# Patient Record
Sex: FEMALE | Race: WHITE | Employment: UNEMPLOYED | URBAN - METROPOLITAN AREA
[De-identification: names, ages, dates, MRNs, and addresses within clinical notes are randomized per-mention and may not be internally consistent; named-entity substitution may affect disease eponyms.]

---

## 2020-02-06 ENCOUNTER — OFFICE VISIT (OUTPATIENT)
Dept: ORTHOPEDIC SURGERY | Age: 55
End: 2020-02-06
Payer: COMMERCIAL

## 2020-02-06 VITALS — WEIGHT: 202 LBS | RESPIRATION RATE: 16 BRPM | HEIGHT: 65 IN | BODY MASS INDEX: 33.66 KG/M2

## 2020-02-06 PROCEDURE — 99243 OFF/OP CNSLTJ NEW/EST LOW 30: CPT | Performed by: INTERNAL MEDICINE

## 2020-02-06 PROCEDURE — L3908 WHO COCK-UP NONMOLDE PRE OTS: HCPCS | Performed by: INTERNAL MEDICINE

## 2020-02-06 NOTE — PROGRESS NOTES
is      Palpation: Focal tenderness over the lateral condyle region reproducing her pain      Range of Motion: Full range with flexion and extension with low-grade pain      Strength: Normal without reproduction of pain extensor supinator      Special Tests: Modified liftoff minimally positive      Skin: There are no rashes, ulcerations or lesions. Gait: Nonantalgic      Reflex intact upper     Additional Comments:        Additional Examinations:           Left Upper Extremity: Examination of the left upper extremity does not show any tenderness, deformity or injury. Range of motion is unremarkable. There is no gross instability. There are no rashes, ulcerations or lesions. Strength and tone are normal.   Neurologic -Light touch sensation and manual muscle testing is normal C5-C8 . Biceps and triceps reflexes are symmetric and +2.  Spurlling sign is negative         Office Imaging Results/Procedures PerformedToday:      Radiology:      X-rays obtained and reviewed in office:   Views 3 views right elbow   Location right elbow   Impression radiocapitellar joint is congruent radial humeral ulnohumeral joints have normal radiographic appearance no other soft tissue or osseous abnormalities on her trochlear articulation is anatomic,       Office Procedures:     Orders Placed This Encounter   Procedures    XR ELBOW RIGHT (MIN 3 VIEWS)     Standing Status:   Future     Number of Occurrences:   1     Standing Expiration Date:   2/6/2021     Order Specific Question:   Reason for exam:     Answer:   pain    US EXTREMITY RIGHT NON VASC LIMITED     Standing Status:   Future     Standing Expiration Date:   2/6/2021     Order Specific Question:   Reason for exam:     Answer:   dx    Ambulatory referral to Occupational Therapy     Referral Priority:   Routine     Referral Type:   Eval and Treat     Referral Reason:   Specialty Services Required     Requested Specialty:   Occupational Therapy     Number of Visits Activity modification-lateral epicondylitis protocol  Formal course of OT Home exercise program  Ultrasound-guided steroid injection only for escalating pain levels  Trial of nocturnal bracing/splinting  Consider biologic orthopedic injection-type I PRP Jens Arthrex or percutaneous tenotomy Tenex microtip PRN      The nature of the finding, probable diagnosis and likely treatment was thoroughly discussed with the patient. The options, risks, complications, alternative treatment as well as some of the differential diagnosis was discussed. The patient was thoroughly informed and all questions were answered. the patient indicated understanding and satisfaction with the discussion. Orders:        Orders Placed This Encounter   Procedures    XR ELBOW RIGHT (MIN 3 VIEWS)     Standing Status:   Future     Number of Occurrences:   1     Standing Expiration Date:   2/6/2021     Order Specific Question:   Reason for exam:     Answer:   pain    US EXTREMITY RIGHT NON VASC LIMITED     Standing Status:   Future     Standing Expiration Date:   2/6/2021     Order Specific Question:   Reason for exam:     Answer:   dx    Ambulatory referral to Occupational Therapy     Referral Priority:   Routine     Referral Type:   Eval and Treat     Referral Reason:   Specialty Services Required     Requested Specialty:   Occupational Therapy     Number of Visits Requested:   1    Starla Hernandez Titan Wrist Short Brace     Patient was prescribed a Starla Hernandez Titan Wrist Orthosis. The right wrist will require stabilization / immobilization from this semi-rigid / rigid orthosis to improve their function. The orthosis will assist in protecting the affected area, provide functional support and facilitate healing. The patient was educated and fit by a healthcare professional with expert knowledge and specialization in brace application while under the direct supervision of the treating physician.   Verbal and written instructions for

## 2020-02-07 ENCOUNTER — HOSPITAL ENCOUNTER (OUTPATIENT)
Dept: OCCUPATIONAL THERAPY | Age: 55
Setting detail: THERAPIES SERIES
Discharge: HOME | End: 2020-02-07
Payer: COMMERCIAL

## 2020-02-07 PROCEDURE — 97140 MANUAL THERAPY 1/> REGIONS: CPT | Performed by: OCCUPATIONAL THERAPIST

## 2020-02-07 PROCEDURE — 97535 SELF CARE MNGMENT TRAINING: CPT | Performed by: OCCUPATIONAL THERAPIST

## 2020-02-07 PROCEDURE — 97110 THERAPEUTIC EXERCISES: CPT | Performed by: OCCUPATIONAL THERAPIST

## 2020-02-07 PROCEDURE — 97165 OT EVAL LOW COMPLEX 30 MIN: CPT | Performed by: OCCUPATIONAL THERAPIST

## 2020-02-07 NOTE — FLOWSHEET NOTE
Observations: Other:                  MODALITIES:      Fluidotherapy (12476)      Estim (74918/89399)      Paraffin (80063)      US (32994)      Iontophoresis (08523)      Hot Pack      Cold Pack            INTERVENTIONS:      Therapeutic Exercise (07371)      FA stretches 5x15 sec hold ea     Elbow ext/flex 5x10 sec hold ea                 Therapeutic Activity (18891)                              Manual Therapy (94774) IASTM forearm      Cross friction           Neuromuscular Reeducation (86754)                  ADL Training (80851) Instructed on diagnosis specific anatomy, joint protection, and ADL modifications                 HEP Training/Review See sheet(s)                 Splinting      Lcode:      Orthotic Mgmt, Subsequent Enc (62793)      Orthotic Mgmt & Training (34151)            Other:                                Therapeutic Exercise & NMR:  [x] (53186) Provided verbal/tactile cueing for activities related to strengthening, flexibility, endurance, ROM  for improvements in scapular, scapulothoracic and UE control with self care, reaching, carrying, lifting, house/yardwork, driving/computer work.    [] (22978) Provided verbal/tactile cueing for activities related to improving balance, coordination, kinesthetic sense, posture, motor skill, proprioception  to assist with  scapular, scapulothoracic and UE control with self care, reaching, carrying, lifting, house/yardwork, driving/computer work.     Therapeutic Activities & NMR:    [] (16150 or 72270) Provided verbal/tactile cueing for activities related to improving balance, coordination, kinesthetic sense, posture, motor skill, proprioception and motor activation to allow for proper function of scapular, scapulothoracic and UE control with self care, carrying, lifting, driving/computer work    Home Exercise Program:    [x] (76581) Reviewed/Progressed HEP activities related to strengthening, flexibility, endurance, ROM of scapular, scapulothoracic and UE control with self care, reaching, carrying, lifting, house/yardwork, driving/computer work  [] (78994) Reviewed/Progressed HEP activities related to improving balance, coordination, kinesthetic sense, posture, motor skill, proprioception of scapular, scapulothoracic and UE control with self care, reaching, carrying, lifting, house/yardwork, driving/computer work      Manual Treatments:  PROM / STM / Oscillations-Mobs:  G-I, II, III, IV (PA's, Inf., Post.)  [x] (89547) Provided manual therapy to mobilize soft tissue/joints of cervical/CT, scapular GHJ and UE for the purpose of modulating pain, promoting relaxation,  increasing ROM, reducing/eliminating soft tissue swelling/inflammation/restriction, improving soft tissue extensibility and allowing for proper ROM for normal function with self care, reaching, carrying, lifting, house/yardwork, driving/computer work    ADL Training:  [x] (22099) Provided self-care/home management training related to activities of daily living and compensatory training, and/or use of adaptive equipment        Charges:  Timed Code Treatment Minutes: 40   Total Treatment Minutes: 60      BWC :  Treatment start and end time:               ( Breakdown times for each code)    [x] EVAL (LOW) 71381 (typically 20 minutes face-to-face)    [] EVAL (MOD) 44938 (typically 30 minutes face-to-face)  [] EVAL (HIGH) [phone] (typically 45 minutes face-to-face)  [] OT Re-eval (74297)       [x] Arya [phone]) x      [] VJGVP(82320)  [] NMR (19753) x      [] Estim (attended) (21477)   [x] Manual ([phone]) x      [] US (60963)  [] TA () x      [] Paraffin (87064)  [x] ADL  (14 [phone] 24 60) x     [] Splint/L code:    [] Estim (unattended) ([phone])  [] Fluidotherapy (09263)  [] Other:      ASSESSMENT:  See eval    Frequency/Duration:  1-2 days per week for 6 weeks        GOALS:  Patient stated goal:   To be able to resume a normal exer routine without pain  []? Progressing: []? Met: []?  Not Met: []? Adjusted     Therapist goals for Patient:   Short Term Goals: To be achieved in: 2 weeks  1. Independent in HEP and progression per patient tolerance, in order to prevent re-injury. []? Progressing: []? Met: []? Not Met: []? Adjusted   2. Patient will have a decrease in pain to facilitate improvement in movement, function, and ADLs as indicated by Functional Deficits. []? Progressing: []? Met: []? Not Met: []? Adjusted     Long Term Goals to be achieved in 6 weeks (through 3/20/20 ), including patient directed goals to address patient identified performance deficits:  1) Pt to be independent in graded HEP progression with a good level of effort and compliance. []? Progressing: []? Met: []? Not Met: []? Adjusted   2) Pt to report a score of </=25  % on the Quick DASH disability questionnaire for increased performance with carrying, moving, and handling objects. []? Progressing: []? Met: []? Not Met: []? Adjusted   3) Pt will demonstrate increased ROM to elbow ext </=5 degrees, wrist ext 70 degrees for improved independence with dressing . []? Progressing: []? Met: []? Not Met: []? Adjusted   4) Pt will demonstrate increased strength to R  50# and wrist ext 5-/5  for improved independence with lifting, opening containers, and cooking . []? Progressing: []? Met: []? Not Met: []? Adjusted   5) Pt will have a decrease in pain to 2-3/10 to facilitate cooking and cleaning as well as sleeping.  []? Progressing: []? Met: []? Not Met: []? Adjusted         Overall Progression Towards Functional Goals/Treatment Progress Update:  [] Patient is progressing as expected towards functional goals listed. [] Progression is slowed due to complexities/impairments listed. [] Progression has been slowed due to co-morbidities.   [x] Plan just implemented, too soon to assess goals progression <30 days  [] Goals require adjustment due to lack of progress  [] Patient is not progressing as expected and requires additional follow up with physician  [] All goals are met  [] Other:     Prognosis for POC: [x] Good [] Fair  [] Poor    Patient requires continued skilled intervention: [x] Yes  [] No    Treatment/Activity Tolerance:  [x] Patient able to complete treatment  [] Patient limited by fatigue  [] Patient limited by pain    [] Patient limited by other medical complications  [] Other:                  PLAN: See eval  [] Continue per plan of care [] Alter current plan (see comments above)  [x] Plan of care initiated [] Hold pending MD visit [] Discharge      Electronically signed by: Fauzia Hoffman OTR/L CHT  OT [id number]    Note: If patient does not return for scheduled/ recommended follow up visits, this note will serve as a discharge from care along with most recent update on progress.

## 2020-02-07 NOTE — PLAN OF CARE
[address] [place] [address] [place] [address]  Phone [phone]      Fax [phone]          Occupational Therapy/Hand Therapy Certification  Dear Referring Practitioner: Prisca Matt,     We had the pleasure of evaluating the following patient for occupational therapy services at [address] [place] [address]. A summary of our findings can be found in the initial assessment below. This includes our plan of care. If you have any questions or concerns regarding these findings, please do not hesitate to contact me at the office phone number checked above. Thank you for the referral.     Physician Signature:_______________________________Date:__________________  By signing above (or electronic signature), therapists plan is approved by physician      Patient: Jd Thompson   : 1965   MRN: [id number]  Referring Physician: Referring Practitioner: Prisca Matt      Evaluation Date: 2020      Medical Diagnosis Information:  Diagnosis: R lateral epic  M67.90    Treatment Diagnosis: R elbow pain M25.521              Insurance information: OT Insurance Information: [address] [facility]   deductible met   OOP $414/$5000 met  $25 copay    75/OT/PT/ST      Date of Injury: 2020  Date of Surgery: NA    Date of Patient follow up with Physician:5 months    RESTRICTIONS/PRECAUTIONS:     Latex Allergy:  [x]No      []Yes  Pacemaker:  [x] No       [] Yes     Preferred Language for Healthcare:   [x]English       []other:     Functional Scale:48% (Quick DASH)   Date assessed:  2020    SUBJECTIVE: Patient reported deficits/history of current problem:  Started using rowing machine to workout, think she overdid it . Pain in both arms, L got better. Played racket ball and R has cont to getting worse.   Has wrist brace  Pain Scale: 4-5/10  [x]Constant      []Intermittent    []other:  Pain Location:  R lat epic  Easing factors: nothing has helped-  Provocative factors: Movement, usage    [x] Patient reported history, allergies, and medications reviewed - see intake form.        Occupational Profile:  Home Environment: lives with  [] spouse,  [] family,  [] alone,  [] significant other,   [] other:    Occupation/School:not working     Recreational Activities/Meaningful Interests: working out    Prior Level of Function: [x] Independent with ADLs/IADLs     [] Assistance needed (describe):    Patient-Identified Primary Performance Deficits (to be addressed in POC):   [] bathing    [x] household tasks cooking and cleaning   [x] dressing- reaching behind back, pulling up clothes    [] self feeding   [] grooming    [] work/education   [] functional mobility   [x] sleeping/rest   [] toileting/hygiene   [x] recreational activities racket   [] driving    [] community/social participation   [x] other: lifting, opening containers    Comorbidities Affecting Functional Performance:     []Anxiety (F41.9)/Depression (F32.9)   []Diabetes Type 1(E10.65) or 2 (E11.65)   []Rheumatoid Arthritis (M05.9)  []Fibromyalgia (M79.7)  []Neuropathy(G60.9)  []Osteoarthritis(M19.91)  []None   []Other:    Hand Dominance:    [x]  Right    [] Left      OBJECTIVE:    2/7/20    AROM: Right Left   IF MP  PIP  DIP       LF MP  PIP  DIP       RF MP  PIP   DIP       SF MP  PIP  DIP       Digits: tips to [facility]           Thumb MP  IP     Thumb tip to [facility]     Wrist Ext/Flex            RD/UD 65/70 75/80   Forearm sup/pron       Elbow ext/flex   12/147 2/153   Shoulder Flex                   Abd                   IR/ER          Edema:               Strength:      II 35 65   Lateral Pinch     3 Point Pinch     Tip Pinch     MMT:   Wrist flex  wrist ext  Sup  pron     5/5  4/5  5/5  5/5      Observations (including splints, bandages, incisions, scars):      Sensation:  [x] No reported deficits  [] Intact to light touch    [] McIntyre Merary test completed, Met: [] Adjusted        OCCUPATIONAL THERAPY EVALUATION COMPLEXITY JUSTIFICATION:    [x] An occupational profile and medical/therapy history, which includes:   [x] a brief history including medical and/or therapy records relating to the     presenting problem   [] an expanded review of medical and/or therapy records and additional review     of physical, cognitive or psychosocial history related to current functional    performance   [] an extensive additional review of review of medical and/or therapy records and physical, cognitive, or psychosocial history related to current    functional performance    [x] An assessment that identifies performance deficits (relating to physical, cognitive, or psychosocial skills) that result in activity limitations and/or participation restrictions:   [] 1-3 performance deficits   [x] 3-5 performance deficits   [] 5 or more performance deficits    [x] Clinical decision making of:   [x] low complexity, including analysis of occupational profile, data analysis from problem focused assessment, and consideration of a limited number of treatment options. No comorbidities affect occupational performance. No task modifications or assistance needed to complete evaluation. [] moderate complexity, including analysis of occupational profile, data analysis from detailed assessment and consideration of several treatment options. Comorbidities that affect occupational performance may be present. Minimal to moderate task modifications or assistance needed to complete assessment. [] high complexity, including analysis of occupational profile, analysis of data from comprehensive assessment and consideration of multiple treatment options. Multiple comorbidities present that affect occupational performance. Significant task modifications or assistance needed to complete assessment.     Evaluation Code:  [x] Low Complexity EVAL 29673 (typically 30 minutes face to face)  [] Mod Complexity

## 2020-02-14 ENCOUNTER — HOSPITAL ENCOUNTER (OUTPATIENT)
Dept: OCCUPATIONAL THERAPY | Age: 55
Setting detail: THERAPIES SERIES
Discharge: HOME | End: 2020-02-14
Payer: COMMERCIAL

## 2020-02-14 PROCEDURE — 97140 MANUAL THERAPY 1/> REGIONS: CPT | Performed by: OCCUPATIONAL THERAPIST

## 2020-02-14 PROCEDURE — 97110 THERAPEUTIC EXERCISES: CPT | Performed by: OCCUPATIONAL THERAPIST

## 2020-02-14 PROCEDURE — G0283 ELEC STIM OTHER THAN WOUND: HCPCS | Performed by: OCCUPATIONAL THERAPIST

## 2020-02-14 NOTE — FLOWSHEET NOTE
x      [] US (17721)  [] TA (59955) x      [] Paraffin (10281)  [] ADL  (44 [phone] 24 60) x     [] Splint/L code:    [x] Estim (unattended) (45426)  [] Fluidotherapy (24133)  [] Other:      ASSESSMENT:  Pt demonstrates a good understanding of activity modifications and HEP    Frequency/Duration:  1-2 days per week for 6 weeks        GOALS:  Patient stated goal:   To be able to resume a normal exer routine without pain  [x]? Progressing: []? Met: []? Not Met: []? Adjusted     Therapist goals for Patient:   Short Term Goals: To be achieved in: 2 weeks  1. Independent in HEP and progression per patient tolerance, in order to prevent re-injury. [x]? Progressing: []? Met: []? Not Met: []? Adjusted   2. Patient will have a decrease in pain to facilitate improvement in movement, function, and ADLs as indicated by Functional Deficits. [x]? Progressing: []? Met: []? Not Met: []? Adjusted     Long Term Goals to be achieved in 6 weeks (through 3/20/20 ), including patient directed goals to address patient identified performance deficits:  1) Pt to be independent in graded HEP progression with a good level of effort and compliance. [x]? Progressing: []? Met: []? Not Met: []? Adjusted   2) Pt to report a score of </=25  % on the Quick DASH disability questionnaire for increased performance with carrying, moving, and handling objects. [x]? Progressing: []? Met: []? Not Met: []? Adjusted   3) Pt will demonstrate increased ROM to elbow ext </=5 degrees, wrist ext 70 degrees for improved independence with dressing . [x]? Progressing: []? Met: []? Not Met: []? Adjusted   4) Pt will demonstrate increased strength to R  50# and wrist ext 5-/5  for improved independence with lifting, opening containers, and cooking . [x]? Progressing: []? Met: []? Not Met: []? Adjusted   5) Pt will have a decrease in pain to 2-3/10 to facilitate cooking and cleaning as well as sleeping. [x]? Progressing: []? Met: []? Not Met: []?  Adjusted

## 2020-02-25 ENCOUNTER — HOSPITAL ENCOUNTER (OUTPATIENT)
Dept: OCCUPATIONAL THERAPY | Age: 55
Setting detail: THERAPIES SERIES
Discharge: HOME | End: 2020-02-25
Payer: COMMERCIAL

## 2020-02-25 PROCEDURE — 97110 THERAPEUTIC EXERCISES: CPT | Performed by: OCCUPATIONAL THERAPIST

## 2020-02-25 PROCEDURE — G0283 ELEC STIM OTHER THAN WOUND: HCPCS | Performed by: OCCUPATIONAL THERAPIST

## 2020-02-25 PROCEDURE — 97140 MANUAL THERAPY 1/> REGIONS: CPT | Performed by: OCCUPATIONAL THERAPIST

## 2020-02-25 NOTE — FLOWSHEET NOTE
[facility] [employer]  [facility] [address]  Phone [phone]      Fax [phone]    Occupational Therapy Treatment Note/ Progress Report:     Date:  2020    Patient Name:  Osmany Mancuso    :  1965  MRN: [id number]    Medical/Treatment Diagnosis Information:  · Diagnosis: R lateral epic  M67.90   · Treatment Diagnosis: R elbow pain D52.746     Insurance/Certification information:  OT Insurance Information: BCBS federal   deductible met   OOP $414/$5000 met  $25 copay    75/OT/PT/ST  Physician Information:  Referring Practitioner: Lore Gomez  Has the plan of care been signed (Y/N):        []  Yes  [x]  No       Visit # Insurance Allowable Auth Required   3 75 []  Yes []  No        Is this a Progress Report:     []  Yes  [x]  No      If Yes:  Date Range for reporting period:  Beginning  Ending    Progress report will be due (10 Rx or 30 days whichever is less): 70    Recertification will be due (POC Duration  / 90 days whichever is less): 3/20/20    Date of Injury: 2020  Date of Surgery: NA    Date of Patient follow up with Physician:     RESTRICTIONS/PRECAUTIONS:     Latex Allergy:  [x]No      []Yes  Pacemaker:  [x] No       [] Yes     Preferred Language for Healthcare:   [x]English       []other:     Functional Scale: 48% (Quick DASH)   Date assessed:  2020      SUBJECTIVE: Decreased pain while on vacation, but increased since back . Has not had any issues with usage of 1# at home  Patient reported deficits/history of current problem:  Started using rowing machine to workout, think she overdid it . Pain in both arms, L got better. Played racket ball and R has cont to getting worse. Has wrist brace    Pain Scale: 4-5/10      Current:   At worse 6-7/10  Generally 3/10  No longer constant      OBJECTIVE:       Date:  2020   Objective Measures/Tests:      ROM: See jocelyne Wrist Ext/Flex R 65/70  L 75/80     Elbow ext/flex    R 12/147  L 2/153  R 2/         Strength:       II R 35#  L 65#  R 60# pain 8/10   MMT:   Wrist flex  wrist ext  Sup  pron R  5/5  4/5  5/5  5/5  R  5/5  4+/5 pain 6/10   Observations:        Other:                  MODALITIES:      Fluidotherapy (89426)      Estim (43564/71016)  HVGS with HP   10' HVGS with HP   10'   Paraffin (30393)      US (93241)      Iontophoresis (83515)      Hot Pack      Cold Pack            INTERVENTIONS:      Therapeutic Exercise (15900)      FA stretches 5x15 sec hold ea 5x15 sec hold ea 5x15 sec hold ea   Elbow ext/flex 5x10 sec hold ea       Red web   Ext musculature stretch   5x10 sec hold Red web   Ext musculature stretch   5x10 sec hold   Strengthening  1#  ecc wrist ext  Wrist flex  RD/UD   ecc sup 2#  ecc wrist ext  Wrist flex  RD/UD   ecc sup  Elbow flex   10x2  ( written program that includes how to progress wt.)     Red flexbar  Pron  Wrist flex  10xea Red flexbar  Pron  Wrist flex  10xea      Corner stretch  3x 20 sec hold        backward Shoulder circles   10 x 5 sec hold               Therapeutic Activity (51117)                              Manual Therapy (59213) IASTM forearm DTM DTM  ( pain in ext musculature today)    Cross friction           Neuromuscular Reeducation (05719)                  ADL Training (87993) Instructed on diagnosis specific anatomy, joint protection, and ADL modifications Reviewed and discussed precautions of carrying and lifting suitcase on her trip                HEP Training/Review See sheet(s)                 Splinting      Lcode:      Orthotic Mgmt, Subsequent Enc (49306)      Orthotic Mgmt & Training (20223)            Other:                                Therapeutic Exercise & NMR:  [x] (81620) Provided verbal/tactile cueing for activities related to strengthening, flexibility, endurance, ROM  for improvements in scapular, scapulothoracic and UE control with self care, reaching, carrying, lifting, house/yardwork, driving/computer work. [x] (54350) Provided verbal/tactile cueing for activities related to improving balance, coordination, kinesthetic sense, posture, motor skill, proprioception  to assist with  scapular, scapulothoracic and UE control with self care, reaching, carrying, lifting, house/yardwork, driving/computer work.     Therapeutic Activities & NMR:    [] (84065 or 08482) Provided verbal/tactile cueing for activities related to improving balance, coordination, kinesthetic sense, posture, motor skill, proprioception and motor activation to allow for proper function of scapular, scapulothoracic and UE control with self care, carrying, lifting, driving/computer work    Home Exercise Program:    [x] (59568) Reviewed/Progressed HEP activities related to strengthening, flexibility, endurance, ROM of scapular, scapulothoracic and UE control with self care, reaching, carrying, lifting, house/yardwork, driving/computer work  [] (34400) Reviewed/Progressed HEP activities related to improving balance, coordination, kinesthetic sense, posture, motor skill, proprioception of scapular, scapulothoracic and UE control with self care, reaching, carrying, lifting, house/yardwork, driving/computer work      Manual Treatments:  PROM / STM / Oscillations-Mobs:  G-I, II, III, IV (PA's, Inf., Post.)  [x] (29296) Provided manual therapy to mobilize soft tissue/joints of cervical/CT, scapular GHJ and UE for the purpose of modulating pain, promoting relaxation,  increasing ROM, reducing/eliminating soft tissue swelling/inflammation/restriction, improving soft tissue extensibility and allowing for proper ROM for normal function with self care, reaching, carrying, lifting, house/yardwork, driving/computer work    ADL Training:  [x] (14565) Provided self-care/home management training related to activities of daily living and compensatory training, and/or use of adaptive equipment        Charges:  Timed Code Treatment Minutes: 50   Total Treatment Minutes: 60      BWC :  Treatment start and end time:               ( Breakdown times for each code)    [] EVAL (LOW) 22 [id number] (typically 20 minutes face-to-face)    [] EVAL (MOD) 65352 (typically 30 minutes face-to-face)  [] EVAL (HIGH) 95864 (typically 45 minutes face-to-face)  [] OT Re-eval (37780)       [x] Arya [phone]) x  2    [] OPSYP(65526)  [] NMR (05966) x      [] Estim (attended) (27031)   [x] Manual ([phone]) x      [] US (19480)  [] TA (74364) x      [] Paraffin (60296)  [] ADL  ([phone]) x     [] Splint/L code:    [x] Estim (unattended) (53095  [facility])  [] Fluidotherapy (99032)  [] Other:      ASSESSMENT:  Pt with increased c/o pain in ext musculature today; pain has been focused at lat epic up to this point. She does demonstrate and verbalize a good understanding of home strengthening program and how to progress, thus will followup in 2 weeks    Frequency/Duration:  1-2 days per week for 6 weeks        GOALS:  Patient stated goal:   To be able to resume a normal exer routine without pain  [x]? Progressing: []? Met: []? Not Met: []? Adjusted     Therapist goals for Patient:   Short Term Goals: To be achieved in: 2 weeks  1. Independent in HEP and progression per patient tolerance, in order to prevent re-injury. [x]? Progressing: []? Met: []? Not Met: []? Adjusted   2. Patient will have a decrease in pain to facilitate improvement in movement, function, and ADLs as indicated by Functional Deficits. [x]? Progressing: []? Met: []? Not Met: []? Adjusted     Long Term Goals to be achieved in 6 weeks (through 3/20/20 ), including patient directed goals to address patient identified performance deficits:  1) Pt to be independent in graded HEP progression with a good level of effort and compliance. [x]? Progressing: []? Met: []? Not Met: []?  Adjusted   2) Pt to report a score of </=25  % on the Quick DASH disability questionnaire for increased performance with carrying,

## 2020-03-10 ENCOUNTER — HOSPITAL ENCOUNTER (OUTPATIENT)
Dept: OCCUPATIONAL THERAPY | Age: 55
Setting detail: THERAPIES SERIES
Discharge: HOME | End: 2020-03-10
Payer: COMMERCIAL

## 2020-03-10 PROCEDURE — 97110 THERAPEUTIC EXERCISES: CPT | Performed by: OCCUPATIONAL THERAPIST

## 2020-03-10 PROCEDURE — 97140 MANUAL THERAPY 1/> REGIONS: CPT | Performed by: OCCUPATIONAL THERAPIST

## 2020-03-10 PROCEDURE — G0283 ELEC STIM OTHER THAN WOUND: HCPCS | Performed by: OCCUPATIONAL THERAPIST

## 2020-03-10 NOTE — FLOWSHEET NOTE
movement, function, and ADLs as indicated by Functional Deficits. []? Progressing: [x]? Met: []? Not Met: []? Adjusted     Long Term Goals to be achieved in 6 weeks (through 3/20/20 ), including patient directed goals to address patient identified performance deficits:  1) Pt to be independent in graded HEP progression with a good level of effort and compliance. []? Progressing: [x]? Met: []? Not Met: []? Adjusted   2) Pt to report a score of </=25  % on the Quick DASH disability questionnaire for increased performance with carrying, moving, and handling objects. []? Progressing: [x]? Met: []? Not Met: []? Adjusted   3) Pt will demonstrate increased ROM to elbow ext </=5 degrees, wrist ext 70 degrees for improved independence with dressing . []? Progressing: [x]? Met: []? Not Met: []? Adjusted   4) Pt will demonstrate increased strength to R  50# and wrist ext 5-/5  for improved independence with lifting, opening containers, and cooking . [x]? Progressing: [x]? Met: []? Not Met: []? Adjusted   5) Pt will have a decrease in pain to 2-3/10 to facilitate cooking and cleaning as well as sleeping.  []? Progressing: [x]? Met: []? Not Met: []? Adjusted         Overall Progression Towards Functional Goals/Treatment Progress Update:  [x] Patient is progressing as expected towards functional goals listed. [] Progression is slowed due to complexities/impairments listed. [] Progression has been slowed due to co-morbidities.   [] Plan just implemented, too soon to assess goals progression <30 days  [] Goals require adjustment due to lack of progress  [] Patient is not progressing as expected and requires additional follow up with physician  [x] All goals are met  [] Other:     Prognosis for POC: [x] Good [] Fair  [] Poor    Patient requires continued skilled intervention: [] Yes  [x] No    Treatment/Activity Tolerance:  [x] Patient able to complete treatment  [] Patient limited by fatigue  [] Patient limited by pain    [] Patient limited by other medical complications  [] Other:                  PLAN:   [] Continue per plan of care [] Alter current plan - . Followup  [] Plan of care initiated [] Hold pending MD visit [x] Discharge - all  Goals met      Electronically signed by: Hallie Mohs OTR/L CHT  OT [id number]    Note: If patient does not return for scheduled/ recommended follow up visits, this note will serve as a discharge from care along with most recent update on progress.